# Patient Record
Sex: FEMALE | Race: WHITE | Employment: OTHER | URBAN - METROPOLITAN AREA
[De-identification: names, ages, dates, MRNs, and addresses within clinical notes are randomized per-mention and may not be internally consistent; named-entity substitution may affect disease eponyms.]

---

## 2017-05-05 LAB — PAP SMEAR: ABNORMAL

## 2017-05-23 ENCOUNTER — OFFICE VISIT (OUTPATIENT)
Dept: FAMILY MEDICINE CLINIC | Age: 42
End: 2017-05-23

## 2017-05-23 VITALS
SYSTOLIC BLOOD PRESSURE: 95 MMHG | HEIGHT: 64 IN | OXYGEN SATURATION: 98 % | BODY MASS INDEX: 24.24 KG/M2 | DIASTOLIC BLOOD PRESSURE: 60 MMHG | TEMPERATURE: 95.9 F | WEIGHT: 142 LBS | HEART RATE: 63 BPM

## 2017-05-23 DIAGNOSIS — Z86.2 HISTORY OF ANEMIA: ICD-10-CM

## 2017-05-23 DIAGNOSIS — Z00.00 ROUTINE GENERAL MEDICAL EXAMINATION AT A HEALTH CARE FACILITY: Primary | ICD-10-CM

## 2017-05-23 DIAGNOSIS — Z23 NEED FOR HEPATITIS A AND B VACCINATION: ICD-10-CM

## 2017-05-23 DIAGNOSIS — Z71.84 TRAVEL ADVICE ENCOUNTER: ICD-10-CM

## 2017-05-23 LAB
A/G RATIO: 1.4 (ref 1.1–2.2)
ALBUMIN SERPL-MCNC: 4.1 G/DL (ref 3.4–5)
ALP BLD-CCNC: 77 U/L (ref 40–129)
ALT SERPL-CCNC: 39 U/L (ref 10–40)
ANION GAP SERPL CALCULATED.3IONS-SCNC: 19 MMOL/L (ref 3–16)
AST SERPL-CCNC: 57 U/L (ref 15–37)
BILIRUB SERPL-MCNC: 0.8 MG/DL (ref 0–1)
BUN BLDV-MCNC: 14 MG/DL (ref 7–20)
CALCIUM SERPL-MCNC: 8.7 MG/DL (ref 8.3–10.6)
CHLORIDE BLD-SCNC: 104 MMOL/L (ref 99–110)
CHOLESTEROL, TOTAL: 210 MG/DL (ref 0–199)
CO2: 22 MMOL/L (ref 21–32)
CREAT SERPL-MCNC: 0.8 MG/DL (ref 0.6–1.1)
GFR AFRICAN AMERICAN: >60
GFR NON-AFRICAN AMERICAN: >60
GLOBULIN: 2.9 G/DL
GLUCOSE BLD-MCNC: 82 MG/DL (ref 70–99)
HCT VFR BLD CALC: 43.7 % (ref 36–48)
HDLC SERPL-MCNC: 87 MG/DL (ref 40–60)
HEMOGLOBIN: 14.3 G/DL (ref 12–16)
LDL CHOLESTEROL CALCULATED: 108 MG/DL
MCH RBC QN AUTO: 31.4 PG (ref 26–34)
MCHC RBC AUTO-ENTMCNC: 32.8 G/DL (ref 31–36)
MCV RBC AUTO: 95.6 FL (ref 80–100)
PDW BLD-RTO: 13.4 % (ref 12.4–15.4)
PLATELET # BLD: 251 K/UL (ref 135–450)
PMV BLD AUTO: 8.2 FL (ref 5–10.5)
POTASSIUM SERPL-SCNC: 4.4 MMOL/L (ref 3.5–5.1)
RBC # BLD: 4.57 M/UL (ref 4–5.2)
SODIUM BLD-SCNC: 145 MMOL/L (ref 136–145)
TOTAL PROTEIN: 7 G/DL (ref 6.4–8.2)
TRIGL SERPL-MCNC: 77 MG/DL (ref 0–150)
VLDLC SERPL CALC-MCNC: 15 MG/DL
WBC # BLD: 4.8 K/UL (ref 4–11)

## 2017-05-23 PROCEDURE — 90746 HEPB VACCINE 3 DOSE ADULT IM: CPT | Performed by: FAMILY MEDICINE

## 2017-05-23 PROCEDURE — 99396 PREV VISIT EST AGE 40-64: CPT | Performed by: FAMILY MEDICINE

## 2017-05-23 PROCEDURE — 90472 IMMUNIZATION ADMIN EACH ADD: CPT | Performed by: FAMILY MEDICINE

## 2017-05-23 PROCEDURE — 90471 IMMUNIZATION ADMIN: CPT | Performed by: FAMILY MEDICINE

## 2017-05-23 PROCEDURE — 90632 HEPA VACCINE ADULT IM: CPT | Performed by: FAMILY MEDICINE

## 2017-05-23 PROCEDURE — 90714 TD VACC NO PRESV 7 YRS+ IM: CPT | Performed by: FAMILY MEDICINE

## 2017-05-23 RX ORDER — MEFLOQUINE HYDROCHLORIDE 250 MG/1
TABLET ORAL
Qty: 8 TABLET | Refills: 0 | Status: SHIPPED | OUTPATIENT
Start: 2017-05-23 | End: 2018-01-24

## 2018-01-24 ENCOUNTER — OFFICE VISIT (OUTPATIENT)
Dept: FAMILY MEDICINE CLINIC | Age: 43
End: 2018-01-24

## 2018-01-24 VITALS — BODY MASS INDEX: 24.05 KG/M2 | WEIGHT: 140 LBS

## 2018-01-24 DIAGNOSIS — R07.9 CHEST PAIN, UNSPECIFIED TYPE: Primary | ICD-10-CM

## 2018-01-24 DIAGNOSIS — J30.2 SEASONAL ALLERGIC RHINITIS, UNSPECIFIED CHRONICITY, UNSPECIFIED TRIGGER: ICD-10-CM

## 2018-01-24 PROCEDURE — 99214 OFFICE O/P EST MOD 30 MIN: CPT | Performed by: FAMILY MEDICINE

## 2018-01-24 PROCEDURE — 93000 ELECTROCARDIOGRAM COMPLETE: CPT | Performed by: FAMILY MEDICINE

## 2018-01-24 RX ORDER — SPIRONOLACTONE 25 MG/1
25 TABLET ORAL DAILY
COMMUNITY
End: 2019-07-11

## 2018-01-24 NOTE — PROGRESS NOTES
Patient is here for form for marathon on 4/8/18 in [facility]. She has run [address], including [address], [address] [place] (2) [address], [place] [address] (3), [address]. [facility] in early November. No chest pain or shortness of breath . She has been training since Thanksgiving. Some chest discomfort X 1 week. Not exertional.  No cough or fever. No wheezing. Started 1 week ago . Better today. Unsure what aggravates the pain, or relieves the ernesto . She did stop during a heat class - strength training with slight cardio. Ran this am without problems. Left upper chest. No palpitations or shortness of breath . Intermittent , not daily. Has not noticed movement or taking a deep breath aggravating it. At one point she was doing burpees in cardio/ strength training class when it was noticed. Last PAP:4/17- [facility]  History of Abnormal PAP: never   Prior mammogram: 3/16  Sexually active: yes  Self breast exam: yes but sporadic  LMP: q 2.5 - 4 weeks . Last 4-5 days . No bleeding between. Smoker: no  Alcohol: 3-4 wine / week. Caffeine: 1 coffee/ day   Exercise: runs marathons. ROS: All other systems were reviewed and are negative . Patient's allergies and medications were reviewed. Patient's past medical, surgical, social , and family history were reviewed. OBJECTIVE:  Wt 140 lb (63.5 kg)   BMI 24.05 kg/m²   General: NAD, cooperative, alert and oriented X 3. Mood / affect is good. good insight. well hydrated. HEENT: PERRLA, EOMI, TMs - clear. Nasopharynx clear. Neck : no lymphadenopathy, supple, FROM  CV: Regular rate and rhythm , no murmurs/ rub/ gallop. No edema. Lungs : CTA bilaterally, breathing comfortably. Non tender. No edema or erythema. Abdomen: positive bowel sounds, soft , non tender, non distended. No hepatosplenomegaly. No CVA tenderness. Skin: no rashes. Non tender.      ASSESSMENT/  PLAN:  Victoriano Quinteros was seen today for other and chest pain.  Diagnoses and all orders for this visit:    Chest pain, unspecified type- atypical.   -     EKG 12 Lead- normal sinus rhythm   -     Likely musculoskeletal component, but not reproducible today. -     Improved today. Monitor. Hold on NSAIDs  Seasonal allergic rhinitis, unspecified chronicity, unspecified trigger        -    Stable. Health Maintenance . -     Form completed for [place] in [facility]. Follow up if no improvement in 1-2 weeks/ as needed for increased symptoms.

## 2018-07-31 LAB — PAP SMEAR: NORMAL

## 2019-02-06 ENCOUNTER — HOSPITAL ENCOUNTER (OUTPATIENT)
Dept: MAMMOGRAPHY | Age: 44
Discharge: HOME | End: 2019-02-06
Payer: COMMERCIAL

## 2019-02-06 DIAGNOSIS — Z12.31 VISIT FOR SCREENING MAMMOGRAM: ICD-10-CM

## 2019-02-06 PROCEDURE — 77063 BREAST TOMOSYNTHESIS BI: CPT

## 2019-03-06 ENCOUNTER — HOSPITAL ENCOUNTER (OUTPATIENT)
Dept: PHYSICAL THERAPY | Age: 44
Setting detail: THERAPIES SERIES
Discharge: HOME | End: 2019-03-06
Payer: COMMERCIAL

## 2019-03-06 PROCEDURE — 97112 NEUROMUSCULAR REEDUCATION: CPT | Performed by: PHYSICAL THERAPIST

## 2019-03-06 PROCEDURE — 97110 THERAPEUTIC EXERCISES: CPT | Performed by: PHYSICAL THERAPIST

## 2019-03-06 PROCEDURE — 97161 PT EVAL LOW COMPLEX 20 MIN: CPT | Performed by: PHYSICAL THERAPIST

## 2019-03-06 PROCEDURE — G8978 MOBILITY CURRENT STATUS: HCPCS | Performed by: PHYSICAL THERAPIST

## 2019-03-06 PROCEDURE — G8979 MOBILITY GOAL STATUS: HCPCS | Performed by: PHYSICAL THERAPIST

## 2019-03-08 ENCOUNTER — HOSPITAL ENCOUNTER (OUTPATIENT)
Dept: PHYSICAL THERAPY | Age: 44
Setting detail: THERAPIES SERIES
Discharge: HOME | End: 2019-03-08
Payer: COMMERCIAL

## 2019-03-08 PROCEDURE — 97112 NEUROMUSCULAR REEDUCATION: CPT | Performed by: PHYSICAL THERAPIST

## 2019-03-08 PROCEDURE — 97110 THERAPEUTIC EXERCISES: CPT | Performed by: PHYSICAL THERAPIST

## 2019-03-08 PROCEDURE — 97140 MANUAL THERAPY 1/> REGIONS: CPT | Performed by: PHYSICAL THERAPIST

## 2019-03-12 ENCOUNTER — HOSPITAL ENCOUNTER (OUTPATIENT)
Dept: PHYSICAL THERAPY | Age: 44
Setting detail: THERAPIES SERIES
Discharge: HOME | End: 2019-03-12
Payer: COMMERCIAL

## 2019-03-12 PROCEDURE — 97110 THERAPEUTIC EXERCISES: CPT | Performed by: PHYSICAL THERAPIST

## 2019-03-12 PROCEDURE — 97140 MANUAL THERAPY 1/> REGIONS: CPT | Performed by: PHYSICAL THERAPIST

## 2019-03-12 PROCEDURE — 97112 NEUROMUSCULAR REEDUCATION: CPT | Performed by: PHYSICAL THERAPIST

## 2019-03-13 ENCOUNTER — APPOINTMENT (OUTPATIENT)
Dept: PHYSICAL THERAPY | Age: 44
End: 2019-03-13
Payer: COMMERCIAL

## 2019-03-14 ENCOUNTER — HOSPITAL ENCOUNTER (OUTPATIENT)
Dept: PHYSICAL THERAPY | Age: 44
Setting detail: THERAPIES SERIES
Discharge: HOME | End: 2019-03-14
Payer: COMMERCIAL

## 2019-03-14 PROCEDURE — 97110 THERAPEUTIC EXERCISES: CPT | Performed by: PHYSICAL THERAPIST

## 2019-03-14 PROCEDURE — 97112 NEUROMUSCULAR REEDUCATION: CPT | Performed by: PHYSICAL THERAPIST

## 2019-03-14 PROCEDURE — 97140 MANUAL THERAPY 1/> REGIONS: CPT | Performed by: PHYSICAL THERAPIST

## 2019-03-20 ENCOUNTER — HOSPITAL ENCOUNTER (OUTPATIENT)
Dept: PHYSICAL THERAPY | Age: 44
Setting detail: THERAPIES SERIES
Discharge: HOME | End: 2019-03-20
Payer: COMMERCIAL

## 2019-03-20 PROCEDURE — 97110 THERAPEUTIC EXERCISES: CPT | Performed by: PHYSICAL THERAPIST

## 2019-03-20 PROCEDURE — 97112 NEUROMUSCULAR REEDUCATION: CPT | Performed by: PHYSICAL THERAPIST

## 2019-03-20 PROCEDURE — 97140 MANUAL THERAPY 1/> REGIONS: CPT | Performed by: PHYSICAL THERAPIST

## 2019-03-22 ENCOUNTER — HOSPITAL ENCOUNTER (OUTPATIENT)
Dept: PHYSICAL THERAPY | Age: 44
Setting detail: THERAPIES SERIES
Discharge: HOME | End: 2019-03-22
Payer: COMMERCIAL

## 2019-03-22 PROCEDURE — 97140 MANUAL THERAPY 1/> REGIONS: CPT | Performed by: PHYSICAL THERAPIST

## 2019-03-22 PROCEDURE — 97110 THERAPEUTIC EXERCISES: CPT | Performed by: PHYSICAL THERAPIST

## 2019-03-22 PROCEDURE — 97112 NEUROMUSCULAR REEDUCATION: CPT | Performed by: PHYSICAL THERAPIST

## 2019-03-26 ENCOUNTER — HOSPITAL ENCOUNTER (OUTPATIENT)
Dept: PHYSICAL THERAPY | Age: 44
Setting detail: THERAPIES SERIES
Discharge: HOME | End: 2019-03-26
Payer: COMMERCIAL

## 2019-03-26 PROCEDURE — 97110 THERAPEUTIC EXERCISES: CPT | Performed by: PHYSICAL THERAPIST

## 2019-03-26 PROCEDURE — 97140 MANUAL THERAPY 1/> REGIONS: CPT | Performed by: PHYSICAL THERAPIST

## 2019-03-26 PROCEDURE — 97112 NEUROMUSCULAR REEDUCATION: CPT | Performed by: PHYSICAL THERAPIST

## 2019-03-28 ENCOUNTER — APPOINTMENT (OUTPATIENT)
Dept: PHYSICAL THERAPY | Age: 44
End: 2019-03-28
Payer: COMMERCIAL

## 2019-04-03 ENCOUNTER — HOSPITAL ENCOUNTER (OUTPATIENT)
Dept: PHYSICAL THERAPY | Age: 44
Setting detail: THERAPIES SERIES
Discharge: HOME | End: 2019-04-03
Payer: COMMERCIAL

## 2019-04-03 PROCEDURE — 97140 MANUAL THERAPY 1/> REGIONS: CPT | Performed by: PHYSICAL THERAPIST

## 2019-04-03 PROCEDURE — 97110 THERAPEUTIC EXERCISES: CPT | Performed by: PHYSICAL THERAPIST

## 2019-04-03 PROCEDURE — 97112 NEUROMUSCULAR REEDUCATION: CPT | Performed by: PHYSICAL THERAPIST

## 2019-04-03 NOTE — FLOWSHEET NOTE
[facility], [address] [facility]  Phone: [phone]  Fax [phone]    Physical Therapy Daily Treatment Note  Date:  4/3/2019    Patient Name:  Emerson Campos    :  1975  MRN: [id number]  Restrictions/Precautions:    Medical/Treatment Diagnosis Information:  · Diagnosis: M76.32 Iliotibial band syndrome  · Treatment Diagnosis: M76.32 Iliotibial band syndrome  Insurance/Certification information:  PT Insurance Information: [facility]- 80/20; 0CP; PT/OT 30 V then auth  Physician Information:  Referring Practitioner: Clara Foster of care signed (Y/N):     Date of Patient follow up with Physician: PRN    G-Code (if applicable):      Date G-Code Applied: 3/6/19        Progress Note: []  Yes  []  No  Next due by: Visit #10       Latex Allergy:  [x]NO      []YES  Preferred Language for Healthcare:   [x]English       []other:    Visit # Insurance Allowable Requires auth    30    []no        []yes: after 30       Pain level:  NT/10     SUBJECTIVE:  Pt denies pain since last visit. She states that she has been able to participate in regular mileage. Reports 80-85% improvement in overall condition.      OBJECTIVE:   Observation:   Test measurements:      RESTRICTIONS/PRECAUTIONS: none    Exercises/Interventions:     Therapeutic Ex Sets/sec Reps Notes   HF (s)/ ITB (s) hep   Glute re-ed series      Flying squirrel  H3 2 x 10  B  Hep   Dynamic HF/gastroc (S) in     Dynamic HS (s) H5 10 HEP   Heavy band HS (s)  Supine HS- 3d (s) + med HS 15 reps  30\" B  ea    Clam (OVL)  Hip abd SLR into ring H5 2 x 10 B    LSD 4\" 2 x 10 B    Shoulders elevated bridge in fig 4 H5 2 x 10  HEP   SL bridge H5  2 x 10 B    Karishma wall slides 45\" 4 B    SL RDL  W/ step up 8# 15 ea B    Lat band walks/ monster walks (forward/ retro) RVL 2 laps ea    Lat glider (OVL) 2 15 ea    \"V\" glider 2 15 ea    Hypers 2 10    Post glider  Hip hiker: 6\"  LSD: 8\" (light tap) 2 15 ea    Manual Intervention      STM/TPR  ART to HS (prone/ supine)-kicks 12 mins     MFD to ITB   Held due to racing           SASTM/ MFD to HS 15 mins  Supine 90-90 AROM         NMR re-education      SB SLR ext in quadruped  + UE H5/ 2 10 On DD   Reformer leg loops ll, \/, /\, amos   Dynamic hip flexor S w/ arm up  Prone hip ext w/ abd  Standing hip abd, speed skate staggered 2R  1R  1R  1R x15 ea  x15 R/L  x15 R/L  x15 ea     ATC                     Pt education: poc, dx, px, role of PT, return to run program, video tape analysis          Therapeutic Exercise and NMR EXR  [x] (59164) Provided verbal/tactile cueing for activities related to strengthening, flexibility, endurance, ROM for improvements in LE, proximal hip, and core control with self care, mobility, lifting, ambulation. [x] (43124) Provided verbal/tactile cueing for activities related to improving balance, coordination, kinesthetic sense, posture, motor skill, proprioception  to assist with LE, proximal hip, and core control in self care, mobility, lifting, ambulation and eccentric single leg control.      NMR and Therapeutic Activities:    [] (37982 or 34986) Provided verbal/tactile cueing for activities related to improving balance, coordination, kinesthetic sense, posture, motor skill, proprioception and motor activation to allow for proper function of core, proximal hip and LE with self care and ADLs  [] (71458) Gait Re-education- Provided training and instruction to the patient for proper LE, core and proximal hip recruitment and positioning and eccentric body weight control with ambulation re-education including up and down stairs     Home Exercise Program:    [x] (39843) Reviewed/Progressed HEP activities related to strengthening, flexibility, endurance, ROM of core, proximal hip and LE for functional self-care, mobility, lifting and ambulation/stair navigation   [] (69451)Reviewed/Progressed HEP activities related to improving balance, coordination, kinesthetic sense, posture, motor skill, proprioception of core, proximal hip and LE for self care, mobility, lifting, and ambulation/stair navigation      Manual Treatments:  PROM / STM / Oscillations-Mobs:  G-I, II, III, IV (PA's, Inf., Post.)  [x] (48296) Provided manual therapy to mobilize LE, proximal hip and/or LS spine soft tissue/joints for the purpose of modulating pain, promoting relaxation,  increasing ROM, reducing/eliminating soft tissue swelling/inflammation/restriction, improving soft tissue extensibility and allowing for proper ROM for normal function with self care, mobility, lifting and ambulation. Modalities:  none    Charges:  Timed Code Treatment Minutes: 60   Total Treatment Minutes: 90     [] EVAL (LOW) 49820 (typically 20 minutes face-to-face)  [] EVAL (MOD) 73320 (typically 30 minutes face-to-face)  [] EVAL (HIGH) 37795 (typically 45 minutes face-to-face)  [] RE-EVAL     [x] HA(08447) x  2   [] IONTO  [x] NMR (29261) x  1   [] VASO  [x] Manual (28986) x  1    [] Other:  [] TA x       [] Mech Traction (37449)  [] ES(attended) (13256)      [] ES (un) (50565):     GOALS:  Patient stated goal: pain free running, after running     Therapist goals for Patient:   Short Term Goals: To be achieved in: 2 weeks  1. Independent in HEP and progression per patient tolerance, in order to prevent re-injury. 2. Patient will have a decrease in pain to facilitate improvement in movement, function, and ADLs as indicated by Functional Deficits.     Long Term Goals: To be achieved in: 6 weeks  1. Disability index score of 0% or less for the LEFS to assist with reaching prior level of function. 2. Patient will demonstrate increased AROM to [facility] and appropriate flexibility/ muscle length to allow for proper joint functioning as indicated by patients Functional Deficits.    3. Patient will demonstrate an increase in Strength to good proximal hip strength and control, within 5lb HHD in LE

## 2019-04-03 NOTE — FLOWSHEET NOTE
[facility], [address] [place] [address] Rob  Phone: [phone]  Fax [phone]      ATHLETIC TRAINING [address]  Date:  4/3/2019    Patient Name:  Nuzhat Vasquez    :  1975  MRN: [id number]  Restrictions/Precautions:    Medical/Treatment Diagnosis Information:  ·  R ITB syndrome     Physician Information:    Referring Practitioner: Jatinder Hebert Post-op  8 wks  12 wks 16 wks 20 wks   24 wks                            Activity Log                                                  DOS/DOI:                                                    Date:  3/22/19 4/3/19   ATC communication:  Court Pilon: ultra marathon  (50K) & [employer] speed work yesterday -P! After  More lat HS & glut activation Fatigued post RX  VC core stab w/ R/L LE   Bike     Elliptical     Treadmill     Airdyne          Reformer Leg loops //, V, /\, amos 2R x15 ea 2R X10 // only                   Dynamic HF S w/ arm up R/L 1R 2x10 Strap R/L // x10 ea                   Prone hamstring curl bilat w/ ext 1R 2x10 R/L 1B x10                   Standing hip abd, speed skate 1R x15 1R1B x10 R/L                   Bridge w/ add 2R1B 10x5\" Ecc 2R x10 ea                   Quadruped HS w/ alt UE  1R1B R/L x5                       Gastroc stretch     Soleus stretch     Hamstring stretch 3D x10    ITB stretch Fig 4 on 30\" box R/L x1' Fig 4 on table R/L 3x30\"   Hip Flexor stretch     Quad stretch     Adductor stretch          Weight Shifting sp                               fp                               tp     Lateral walking (with/w/o TB)          Balance: PEP/Maryanne board                    SLS           Star excursion load/explode           Extremity reach UE/LE          Leg Press Skinny. Ecc.                       Inv. Calf Press Skinny.                         Ecc.                         Inv.          [facility]   Flex ABd                ADd               TKE                Ext          Steps Up                Up and Over                Down                Lateral                Rotation          Squats  mini                   wall                  BOSU           Lunges:  Lunge to Balance                    Balance to Lunge                    Walking          Knee Extension Bilat. Ecc.                                Inv. Hamstring Curls Bilat. Ecc.                                Inv.          Soleus Press Bilat. Ecc.                            Inv.                                Ladders                 Square                Jump/Hop  Low                       Med.                       High                                                               Modality declined declined   Initials                             DB DB   Time spent one on one (workers comp)     Time spent with PT assistant

## 2019-04-05 ENCOUNTER — HOSPITAL ENCOUNTER (OUTPATIENT)
Dept: PHYSICAL THERAPY | Age: 44
Setting detail: THERAPIES SERIES
Discharge: HOME | End: 2019-04-05
Payer: COMMERCIAL

## 2019-04-05 PROCEDURE — 97110 THERAPEUTIC EXERCISES: CPT | Performed by: PHYSICAL THERAPIST

## 2019-04-05 PROCEDURE — 97140 MANUAL THERAPY 1/> REGIONS: CPT | Performed by: PHYSICAL THERAPIST

## 2019-04-05 PROCEDURE — 97164 PT RE-EVAL EST PLAN CARE: CPT | Performed by: PHYSICAL THERAPIST

## 2019-04-05 PROCEDURE — 97112 NEUROMUSCULAR REEDUCATION: CPT | Performed by: PHYSICAL THERAPIST

## 2019-04-05 NOTE — PLAN OF CARE
[facility], [address]  Phone: [phone]  Fax [phone]    Physical Therapy Recertification  Date: 2019        Patient Name:  Deric Elizondo    :  1975  MRN: [id number]  Referring Physician: Sharita Lucas   Diagnosis: ITBS                        ICD Code: M76.32   Therapy Diagnosis/Practice Pattern: Hamstring pain/ glute dysfunction      Total number of visits: 10   Reporting Period:   Beginning GBTH:3-6-8885   End UOVE:    OBJECTIVE  Test used Initial score Current Score   Pain Summary VAS 4 1-2   Functional questionnaire LEFS 6% 3%   Functional Testing            ROM Rom ++ HF/ quad + HF/Quad         Strength Hip flex 4 5-    Hip ER 4 5    Hip abd 3+ 4        Functional Limitation G-Code (if applicable):         PT G-Codes  Functional Assessment Tool Used: LEFS  Score: 3%   Test/tests used to determine % limitation: LEFS  Actual Score used to drive % limitation: 78    Treatment to date:  [x] Therapeutic Exercise    [] Modalities:  [] Therapeutic Activity             []Ultrasound            []Electrical Stimulation  [] Gait Training     []Cervical Traction    [] Lumbar Traction  [x] Neuromuscular Re-education [x] Cold/hotpack         []Iontophoresis  [x] Instruction in HEP      Other:  [x] Manual Therapy                   [x] MFD                       ? []   Assessment:  [] Improvement noted relative to goals:  [] No Improvement noted related to goals:/Patient's response to treatment/Additional assessment: Pt perceives 85% improvement in overall condition. Feels more soreness than pain. Main c/o soreness after speed work. New or Updated Goals (if applicable):  [] No change to goals established upon initial eval/last progress note:  New Goals:  1. Pt will be able to run speed intervals without increased sx's or limitations. 2. Pt will deny physical disability per LEFS. Rehab Potential:   []Excellent   [x] Good   [] Fair   [] Poor    Plan of Care:  [x] Continue Therapy    Frequency/Duration:  # Days per week: [x] 1 day # Weeks: [] 1 week [] 7 weeks     [] 2 days? [] 2 weeks [] 8 weeks     [] 3 days   [] 3 weeks [] 9 weeks     [] 4 days   [x] 4 weeks [] 10 weeks      [] 5 days   [] 5 weeks [] 11 weeks          [] 6 weeks [] 12 weeks    Interventions:  [x] Therapeutic Exercise    [] Modalities:  [] Therapeutic Activity    [] Ultrasound  [] Electrical Stimulation  [] Gait Training     [] Cervical Traction [] Lumbar Traction  [x] Neuromuscular Re-education [x] Cold/hotpack [] Iontophoresis   [x] Instruction in HEP     Other:  [] Manual Therapy      [x] physical performance test/ gait analysis                    []           ?       Electronically signed by:  Karen Gonzalez, PT  [id number]  If you have any questions or concerns, please don't hesitate to call. Thank you for the referral.  Medicare requires recertification of the therapy plan of care. Additional visits are being requested. Please recertify for additional visits:    Physician Signature:____________________________________Date:_______________  By signing above, therapist's plan is approved by the physician.

## 2019-04-05 NOTE — FLOWSHEET NOTE
[facility], [address] Rob  Phone: [phone]  Fax [phone]    Physical Therapy Daily Treatment Note  Date:  2019    Patient Name:  Star Villalba    :  1975  MRN: [id number]  Restrictions/Precautions:    Medical/Treatment Diagnosis Information:  · Diagnosis: M76.32 Iliotibial band syndrome  · Treatment Diagnosis: M76.32 Iliotibial band syndrome  Insurance/Certification information:  PT Insurance Information: [facility]- 80/; 0CP; PT/OT 30 V then [facility]  Physician Information:  Referring Practitioner: Jarocho Valero of care signed (Y/N):     Date of Patient follow up with Physician: PRN    G-Code (if applicable):      Date G-Code Applied: 3/6/19        Progress Note: []  Yes  []  No  Next due by: Visit #10       Latex Allergy:  [x]NO      []YES  Preferred Language for Healthcare:   [x]English       []other:    Visit # Insurance Allowable Requires auth   10 30    []no        []yes: after 30       Pain level:  NT/10     SUBJECTIVE:  See recert     OBJECTIVE:   Observation:   Test measurements:      RESTRICTIONS/PRECAUTIONS: none    Exercises/Interventions:     Therapeutic Ex Sets/sec Reps Notes   HF (s)/ ITB (s)  Standing quad (s) hep   Glute re-ed series      Flying squirrel  H3 2 x 10  B  Hep   Dynamic HF/gastroc (S) in     Dynamic HS (s)  Dynamic quad (standing) H5 10 HEP   Heavy band HS (s)  Supine HS- 3d (s) + med HS 15 reps  30\" B  ea    Clam (OVL)  Hip abd SLR into ring H5 2 x 10 B    LSD 4\" 2 x 10 B    Shoulders elevated bridge in fig 4 H5 2 x 10  HEP   SL bridge H5  2 x 10 B    Karishma wall slides 45\" 4 B    SL RDL  W/ step up 8# 15 ea B    Lat band walks/  BlueVL 2 laps ea    Lat glider (OVL) 2 15 ea    \"V\" glider 2 15 ea    8\" pivot step up with MB 2 15 B    CC (30#) abd walks w/ SLS (inside leg)  10 ea    Hypers 2 10    Post glider  Hip hiker: 6\"  LSD: 8\" (light tap) 2 15 ea    Manual Intervention STM/TPR  ART to HS (prone/ supine)-kicks 12 mins     MFD to ITB   Held due to racing           SASTM/ MFD to HS 15 mins  Supine 90-90 AROM         NMR re-education      SB SLR ext in quadruped  + UE H5/ 2 10 On DD   Reformer leg loops ll, \/, /\, amos   Dynamic hip flexor S w/ arm up  Prone hip ext w/ abd  Standing hip abd, speed skate staggered 2R  1R  1R  1R x15 ea  x15 R/L  x15 R/L  x15 ea     ATC                     Pt education: poc, dx, px, role of PT, return to run program, video tape analysis          Therapeutic Exercise and NMR EXR  [x] (88691) Provided verbal/tactile cueing for activities related to strengthening, flexibility, endurance, ROM for improvements in LE, proximal hip, and core control with self care, mobility, lifting, ambulation. [x] (14204) Provided verbal/tactile cueing for activities related to improving balance, coordination, kinesthetic sense, posture, motor skill, proprioception  to assist with LE, proximal hip, and core control in self care, mobility, lifting, ambulation and eccentric single leg control.      NMR and Therapeutic Activities:    [] (65792 or 15969) Provided verbal/tactile cueing for activities related to improving balance, coordination, kinesthetic sense, posture, motor skill, proprioception and motor activation to allow for proper function of core, proximal hip and LE with self care and ADLs  [] (62228) Gait Re-education- Provided training and instruction to the patient for proper LE, core and proximal hip recruitment and positioning and eccentric body weight control with ambulation re-education including up and down stairs     Home Exercise Program:    [x] (28669) Reviewed/Progressed HEP activities related to strengthening, flexibility, endurance, ROM of core, proximal hip and LE for functional self-care, mobility, lifting and ambulation/stair navigation   [] (06896)Reviewed/Progressed HEP activities related to improving balance, coordination, kinesthetic sense, posture, motor skill, proprioception of core, proximal hip and LE for self care, mobility, lifting, and ambulation/stair navigation      Manual Treatments:  PROM / STM / Oscillations-Mobs:  G-I, II, III, IV (PA's, Inf., Post.)  [x] (75248) Provided manual therapy to mobilize LE, proximal hip and/or LS spine soft tissue/joints for the purpose of modulating pain, promoting relaxation,  increasing ROM, reducing/eliminating soft tissue swelling/inflammation/restriction, improving soft tissue extensibility and allowing for proper ROM for normal function with self care, mobility, lifting and ambulation. Modalities:  none    Charges:  Timed Code Treatment Minutes: 45   Total Treatment Minutes: 60     [] EVAL (LOW) 35139 (typically 20 minutes face-to-face)  [] EVAL (MOD) 78652 (typically 30 minutes face-to-face)  [] EVAL (HIGH) 29990 (typically 45 minutes face-to-face)  [x] RE-EVAL     [x] MY(41511) x  1   [] IONTO  [x] NMR (97204) x  1   [] VASO  [x] Manual (33088) x  1    [] Other:  [] TA x       [] Mech Traction (73681)  [] ES(attended) (10307)      [] ES (un) (82972):     GOALS:  Patient stated goal: pain free running, after running     Therapist goals for Patient:   Short Term Goals: To be achieved in: 2 weeks  1. Independent in HEP and progression per patient tolerance, in order to prevent re-injury. 2. Patient will have a decrease in pain to facilitate improvement in movement, function, and ADLs as indicated by Functional Deficits.     Long Term Goals: To be achieved in: 6 weeks  1. Disability index score of 0% or less for the LEFS to assist with reaching prior level of function. 2. Patient will demonstrate increased AROM to [facility] and appropriate flexibility/ muscle length to allow for proper joint functioning as indicated by patients Functional Deficits.    3. Patient will demonstrate an increase in Strength to good proximal hip strength and control, within 5lb HHD in LE to allow for proper functional mobility as indicated by patients Functional Deficits. 4. Patient will return to all functional activities without increased symptoms or restriction. 5. Pt will deny pain with running or after aerobic activity. (patient specific functional goal)             Progression Towards Functional goals:  [x] Patient is progressing as expected towards functional goals listed. [] Progression is slowed due to complexities listed. [] Progression has been slowed due to co-morbidities. [] Plan just implemented, too soon to assess goals progression  [] Other:     ASSESSMENT:  Pt progressing well- continue manual techniques, reformer and standing CKC exercise as able. Pt may be a candidate for TDN. Pt will make appt to video tape next week.  See recert    Treatment/Activity Tolerance:  [x] Patient tolerated treatment well [] Patient limited by fatique  [] Patient limited by pain  [] Patient limited by other medical complications  [] Other:     Prognosis: [x] Good [] Fair  [] Poor    Patient Requires Follow-up: [x] Yes  [] No    PLAN: See eval  [x] Continue per plan of care [] Alter current plan (see comments)  [] Plan of care initiated [] Hold pending MD visit [] Discharge    Electronically signed by: Silvia La, [address] [place] [address]

## 2019-04-12 ENCOUNTER — APPOINTMENT (OUTPATIENT)
Dept: PHYSICAL THERAPY | Age: 44
End: 2019-04-12
Payer: COMMERCIAL

## 2019-04-17 ENCOUNTER — HOSPITAL ENCOUNTER (OUTPATIENT)
Dept: PHYSICAL THERAPY | Age: 44
Setting detail: THERAPIES SERIES
Discharge: HOME | End: 2019-04-17
Payer: COMMERCIAL

## 2019-04-17 PROCEDURE — 97110 THERAPEUTIC EXERCISES: CPT | Performed by: PHYSICAL THERAPIST

## 2019-04-17 PROCEDURE — 97750 PHYSICAL PERFORMANCE TEST: CPT | Performed by: PHYSICAL THERAPIST

## 2019-04-17 NOTE — OP NOTE
[facility], [address], [address] [place] [address]    Physical Performance Test Report     Type of assessment:   [x] running  [] walking    Subjective History:  Reason for Gait Analysis: analysis of form and technique at tempo pace    Current Injuries: R Hamstring pain (improving); new c/o tingling in L heel and one time B leg tingling at end of long run      Pain:    [x] better overall   [] beginning   [x] end   [] other: after run       Gait Analysis  TM speed: 8:20 tempo pace  Footwear/Orthotics: [employer]cony   Itzel: 170  Warm-Up Time Prior to Video: 8 min (walk/ CP run)    Posterior View Left Right Comments   Trunk      Arm Swing excessive Excessive     Rotation excessive excessive    Side Bending - -    Shoulder Height  Slightly elevated    Vertical Displacement good     Pelvis      Drop 11 8 Walking 3-5 deg  Running 5-7 deg   Knee      Valgus/Varus none none    Rotation none none    Circumduction none none    Foot Left Right Comments      Increased medial tilt of R foot> L; increased toe out L>R   STJ Proation IC to MS More rapid     STJ pronation @ MS 9 7    STJ pronation at TO +3 +2    Heel Whip none none        Lateral View Normal Abnormal Comments   Trunk      Forward Lean  Ant pelvic tilt    Flexion/Ext      Elbow Angle good     Hip Left Right Comments   Ext at TO limited limited    Knee      Flexion at IC present Present     Flexion at MS good good    Foot/Ankle      Strike Type heel heel    Strike Location  Center heel L  Lat heel R    DF at TS -12 L; -15 R limited    Early Toe Off present Present         Assessment:   1. Ant pelvic tilt while running  2. Increased hip drop (L>R)  3. Increased torso rotation through spine and with arm swing drive  4. Early toe off and decreased back swing pull with R>L    Plan:   1. Arm swing drills  2. Increase core stability on unstable surfaces or dynamic positions.    3. Increase ant hip

## 2019-04-17 NOTE — FLOWSHEET NOTE
STM/TPR  ART to HS (prone/ supine)-kicks 12 mins     MFD to ITB   Held due to racing           SASTM/ MFD to HS 15 mins  Supine 90-90 AROM         NMR re-education      SB SLR ext in quadruped  + UE H5/ 2 10 On DD   Reformer leg loops ll, \/, /\, amos   Dynamic hip flexor S w/ arm up  Prone hip ext w/ abd  Standing hip abd, speed skate staggered 2R  1R  1R  1R x15 ea  x15 R/L  x15 R/L  x15 ea     ATC                     Pt education: poc, dx, px, role of PT, return to run program, video tape analysis          Therapeutic Exercise and NMR EXR  [x] (98239) Provided verbal/tactile cueing for activities related to strengthening, flexibility, endurance, ROM for improvements in LE, proximal hip, and core control with self care, mobility, lifting, ambulation. [x] (86540) Provided verbal/tactile cueing for activities related to improving balance, coordination, kinesthetic sense, posture, motor skill, proprioception  to assist with LE, proximal hip, and core control in self care, mobility, lifting, ambulation and eccentric single leg control.      NMR and Therapeutic Activities:    [] (04222 or 62910) Provided verbal/tactile cueing for activities related to improving balance, coordination, kinesthetic sense, posture, motor skill, proprioception and motor activation to allow for proper function of core, proximal hip and LE with self care and ADLs  [] (24662) Gait Re-education- Provided training and instruction to the patient for proper LE, core and proximal hip recruitment and positioning and eccentric body weight control with ambulation re-education including up and down stairs     Home Exercise Program:    [x] (42795) Reviewed/Progressed HEP activities related to strengthening, flexibility, endurance, ROM of core, proximal hip and LE for functional self-care, mobility, lifting and ambulation/stair navigation   [] (12670)Reviewed/Progressed HEP activities related to improving balance, coordination, kinesthetic sense, proper functional mobility as indicated by patients Functional Deficits. 4. Patient will return to all functional activities without increased symptoms or restriction. 5. Pt will deny pain with running or after aerobic activity. (patient specific functional goal)             Progression Towards Functional goals:  [x] Patient is progressing as expected towards functional goals listed. [] Progression is slowed due to complexities listed. [] Progression has been slowed due to co-morbidities.   [] Plan just implemented, too soon to assess goals progression  [] Other:     ASSESSMENT:  See op note    Treatment/Activity Tolerance:  [x] Patient tolerated treatment well [] Patient limited by fatique  [] Patient limited by pain  [] Patient limited by other medical complications  [] Other:     Prognosis: [x] Good [] Fair  [] Poor    Patient Requires Follow-up: [x] Yes  [] No    PLAN: See eval  [x] Continue per plan of care [] Alter current plan (see comments)  [] Plan of care initiated [] Hold pending MD visit [] Discharge    Electronically signed by: Ray Chatterjee, [address] [place] [address]

## 2019-04-25 ENCOUNTER — HOSPITAL ENCOUNTER (OUTPATIENT)
Dept: PHYSICAL THERAPY | Age: 44
Setting detail: THERAPIES SERIES
Discharge: HOME | End: 2019-04-25
Payer: COMMERCIAL

## 2019-04-25 PROCEDURE — 97112 NEUROMUSCULAR REEDUCATION: CPT | Performed by: PHYSICAL THERAPIST

## 2019-04-25 PROCEDURE — 97110 THERAPEUTIC EXERCISES: CPT | Performed by: PHYSICAL THERAPIST

## 2019-04-25 NOTE — FLOWSHEET NOTE
[facility], [address] [place]ares  Phone: [phone]  Fax [phone]    Physical Therapy Daily Treatment Note  Date:  2019    Patient Name:  Jian Reyes    :  1975  MRN: [id number]  Restrictions/Precautions:    Medical/Treatment Diagnosis Information:  · Diagnosis: M76.32 Iliotibial band syndrome  · Treatment Diagnosis: M76.32 Iliotibial band syndrome  Insurance/Certification information:  PT Insurance Information: [facility]- 80/20; 0CP; PT/OT 30 V then auth  Physician Information:  Referring Practitioner: Anette Rea of care signed (Y/N):     Date of Patient follow up with Physician: PRN    G-Code (if applicable):      Date G-Code Applied: 3/6/19        Progress Note: []  Yes  []  No  Next due by: Visit #10       Latex Allergy:  [x]NO      []YES  Preferred Language for Healthcare:   [x]English       []other:    Visit # Insurance Allowable Requires auth   12 30    []no        []yes: after 30       Pain level:  NT/10     SUBJECTIVE:  Pt reports she was able to do 24 miles on trail. No pain in HS; some slight soreness in calf.     OBJECTIVE:   Observation:   Test measurements:      RESTRICTIONS/PRECAUTIONS: none    Exercises/Interventions:     Therapeutic Ex Sets/sec Reps Notes   HF (s)/ ITB (s)  Standing quad (s) hep   Glute re-ed series      Flying squirrel  H3 2 x 10  B  Hep   Dynamic HF/gastroc (S) in     Dynamic HS (s)  Dynamic quad (standing) H5 10 HEP   Heavy band HS (s)  Supine HS- 3d (s) + med HS 15 reps  30\" B  ea    Clam (OVL)  Hip abd SLR into ring H5 2 x 10 B    LSD 4\" 2 x 10 B    Shoulders elevated bridge in fig 4 H5 2 x 10  HEP   SL bridge H5  2 x 10 B    BOSU 3 way kick LVL 15 rounds B    BOSU spring 2 12                Karishma wall slides 45\" 4 B    SL RDL  W/ step up 8# 15 ea B    Lat band walks/  BlueVL 2 laps ea    Lat glider (OVL) 2 15 ea    \"V\" glider 2 15 ea    8\" pivot step up with MB 2 15 B CC (30#) abd walks w/ SLS (inside leg)  10 ea    Hypers 2 10    Post glider  Hip hiker: 6\"  LSD: 8\" (light tap) 2 15 ea    Manual Intervention          MFD to ITB               Supine 90-90 AROM         NMR re-education      SB SLR ext in quadruped  + UE H5/ 2 10 On DD   Reformer leg loops ll, \/, /\, amos   Dynamic hip flexor S w/ arm up  Prone hip ext w/ abd  Standing hip abd, speed skate staggered 2R  1R  1R  1R x15 ea  x15 R/L  x15 R/L  x15 ea     ATC   TB ext with CL march GTB 2 x 15 B On BOSU    Bosu squat with double GTB push  20 ea side    BOSU plank taps  BOSU round the world at feet 2  2 15 ea LE  10 rounds    Pt education: poc, dx, px, role of PT, return to run program, video tape analysis          Therapeutic Exercise and NMR EXR  [x] (68509) Provided verbal/tactile cueing for activities related to strengthening, flexibility, endurance, ROM for improvements in LE, proximal hip, and core control with self care, mobility, lifting, ambulation. [x] (23466) Provided verbal/tactile cueing for activities related to improving balance, coordination, kinesthetic sense, posture, motor skill, proprioception  to assist with LE, proximal hip, and core control in self care, mobility, lifting, ambulation and eccentric single leg control.      NMR and Therapeutic Activities:    [] (21091 or 65111) Provided verbal/tactile cueing for activities related to improving balance, coordination, kinesthetic sense, posture, motor skill, proprioception and motor activation to allow for proper function of core, proximal hip and LE with self care and ADLs  [] (76320) Gait Re-education- Provided training and instruction to the patient for proper LE, core and proximal hip recruitment and positioning and eccentric body weight control with ambulation re-education including up and down stairs     Home Exercise Program:    [x] (24132) Reviewed/Progressed HEP activities related to strengthening, flexibility, endurance, ROM of core, proximal hip and LE for functional self-care, mobility, lifting and ambulation/stair navigation   [] (72137)Reviewed/Progressed HEP activities related to improving balance, coordination, kinesthetic sense, posture, motor skill, proprioception of core, proximal hip and LE for self care, mobility, lifting, and ambulation/stair navigation      Manual Treatments:  PROM / STM / Oscillations-Mobs:  G-I, II, III, IV (PA's, Inf., Post.)  [x] (92169) Provided manual therapy to mobilize LE, proximal hip and/or LS spine soft tissue/joints for the purpose of modulating pain, promoting relaxation,  increasing ROM, reducing/eliminating soft tissue swelling/inflammation/restriction, improving soft tissue extensibility and allowing for proper ROM for normal function with self care, mobility, lifting and ambulation. Modalities:  none    Charges:  Timed Code Treatment Minutes: 35   Total Treatment Minutes: 61 (ATC)     [] EVAL (LOW) 84170 (typically 20 minutes face-to-face)  [] EVAL (MOD) 25599 (typically 30 minutes face-to-face)  [] EVAL (HIGH) 59057 (typically 45 minutes face-to-face)  [x] RE-EVAL     [x] HV(91166) x  1   [] IONTO  [x] NMR (91221) x  1   [] VASO  [x] Manual (25937) x       [x] Other:   [] TA x       [] Mech Traction (35049)  [] ES(attended) (17017)      [] ES (un) (59288):     GOALS:  Patient stated goal: pain free running, after running     Therapist goals for Patient:   Short Term Goals: To be achieved in: 2 weeks  1. Independent in HEP and progression per patient tolerance, in order to prevent re-injury. 2. Patient will have a decrease in pain to facilitate improvement in movement, function, and ADLs as indicated by Functional Deficits.     Long Term Goals: To be achieved in: 6 weeks  1. Disability index score of 0% or less for the LEFS to assist with reaching prior level of function.    2. Patient will demonstrate increased AROM to [facility] and appropriate flexibility/ muscle length to allow for proper joint functioning as indicated by patients Functional Deficits. 3. Patient will demonstrate an increase in Strength to good proximal hip strength and control, within 5lb HHD in LE to allow for proper functional mobility as indicated by patients Functional Deficits. 4. Patient will return to all functional activities without increased symptoms or restriction. 5. Pt will deny pain with running or after aerobic activity. (patient specific functional goal)             Progression Towards Functional goals:  [x] Patient is progressing as expected towards functional goals listed. [] Progression is slowed due to complexities listed. [] Progression has been slowed due to co-morbidities.   [] Plan just implemented, too soon to assess goals progression  [] Other:     ASSESSMENT:  Progressing dynamic stability- possible d/c NV after 2 week trial    Treatment/Activity Tolerance:  [x] Patient tolerated treatment well [] Patient limited by fatique  [] Patient limited by pain  [] Patient limited by other medical complications  [] Other:     Prognosis: [x] Good [] Fair  [] Poor    Patient Requires Follow-up: [x] Yes  [] No    PLAN: See eval  [x] Continue per plan of care [] Alter current plan (see comments)  [] Plan of care initiated [] Hold pending MD visit [] Discharge    Electronically signed by: Lyle Calvo, [address] [place] [address]

## 2019-04-25 NOTE — FLOWSHEET NOTE
[facility], [address] [facility]  Phone: [phone]  Fax [phone]      ATHLETIC TRAINING [address]  Date:  2019    Patient Name:  Deric Elizondo    :  1975  MRN: [id number]  Restrictions/Precautions:    Medical/Treatment Diagnosis Information:  ·  R ITB syndrome     Physician Information:    Referring Practitioner: Flavia Henriquez Post-op  8 wks  12 wks 16 wks 20 wks   24 wks                            Activity Log                                                  DOS/DOI:                                                    Date:  3/22/19 4/3/19 4/25/19   ATC communication:  Cheryal Layer: ultra marathon  (50K) & [employer] speed work yesterday -P!  After  More lat HS & glut activation Fatigued post RX  VC core stab w/ R/L LE    Bike      Elliptical      Treadmill      Airdyne            Reformer Leg loops //, V, /\, amos 2R x15 ea 2R X10 // only 2R // ring abd 15x  V ball add 15x  /\, amos 15x ea                   Dynamic HF S w/ arm up R/L 1R 2x10 Strap R/L // x10 ea                    Prone hamstring curl bilat w/ ext 1R 2x10 R/L 1B x10 1R R/L 10x N,ER                   Standing hip abd, speed skate 1R x15 1R1B x10 R/L 1R R/L 15x                   Bridge w/ add 2R1B 10x5\" Ecc 2R x10 ea 2R w/pushout 10x                   Quadruped HS w/ alt UE  1R1B R/L x5 1R1B R/L x5                           Gastroc stretch      Soleus stretch      Hamstring stretch 3D x10     ITB stretch Fig 4 on 30\" box R/L x1' Fig 4 on table R/L 3x30\"    Hip Flexor stretch   Reformer foot on shld rest, slide back, arm in flex 3x10\" R/L 1B   Quad stretch      Adductor stretch            Weight Shifting sp                                fp                                tp      Lateral walking (with/w/o TB)            Balance: PEP/Maryanne board                     SLS            Star excursion load/explode

## 2019-05-01 ENCOUNTER — APPOINTMENT (OUTPATIENT)
Dept: PHYSICAL THERAPY | Age: 44
End: 2019-05-01
Payer: COMMERCIAL

## 2019-05-08 ENCOUNTER — HOSPITAL ENCOUNTER (OUTPATIENT)
Dept: PHYSICAL THERAPY | Age: 44
Setting detail: THERAPIES SERIES
Discharge: HOME | End: 2019-05-08
Payer: COMMERCIAL

## 2019-05-08 PROCEDURE — 97110 THERAPEUTIC EXERCISES: CPT | Performed by: PHYSICAL THERAPIST

## 2019-05-08 PROCEDURE — 97140 MANUAL THERAPY 1/> REGIONS: CPT | Performed by: PHYSICAL THERAPIST

## 2019-05-08 NOTE — DISCHARGE SUMMARY
[facility], [address] [place] [address]  Phone: [phone]  Fax [phone]       Physical Therapy Discharge  Date: 2019        Patient Name:  Arun Nichole    :  1975  MRN: [id number]  Referring Physician: Jing Wynn  Diagnosis: R Hamstring/ ITB                         ICD Code: M76.32  [] Surgical [x] Conservative   Therapy Diagnosis/Practice Pattern: R hamstring pain/ glute dysfunction; C       Number of Comorbidities:  [x]0     []1-2    []3+  Total number of visits: 13   Reporting Period:   Beginning [id number]   End ZQUO:    OBJECTIVE  Test used Initial score Discharge Score   Pain Summary 0-10 1-4 0   Functional questionnaire LEFS 6% 1%   Functional Testing            ROM Rom  ++ tight Min tight    Wilian + tight WNL   Strength Hip abd 3+ 5-    Hip flex 4 5-        Test/tests used to determine % limitation: LEFS  Actual Score used to drive % [id number]    Treatment to date:  [x] Therapeutic Exercise    [] Modalities:  [] Therapeutic Activity             []Ultrasound            []Electrical Stimulation  [] Gait Training     []Cervical Traction    [] Lumbar Traction  [x] Neuromuscular Re-education [x] Cold/hotpack         []Iontophoresis  [x] Instruction in HEP      Other:  [x] Manual Therapy                   []                       ?           []   Assessment:  [x] All Goals were achieved.   [] The following goals were achieved (#'s):  [x] The following goals were not achieved for the following reasons:/assessmen of improvement as it relates to each goal: Pt perceives 95% improvement in overall    Plan of Care:  [x] Discharge from Therapy Services due to:    Reason for Discharge:   [x] All goals achieved    [] Patient having surgery  [] Physician discontinued therapy  [] Insurance/Financial Limitations [] Patient did not return for follow up visits [] Home program/1 visit only   [] No subjective or objective improvement [] Plateaued   [] Patient was unable to adhere to the plan of care established at evaluation. [] Referred back to physician for re-evaluation and did not return.      [x] Other:?possible enrollment in [employer]       Electronically signed by:  James Brito, [address] [place] [address]

## 2019-05-08 NOTE — FLOWSHEET NOTE
[facility], [address] [place]  Phone: [phone]  Fax [phone]    Physical Therapy Daily Treatment Note  Date:  2019    Patient Name:  Malissa Oh    :  1975  MRN: [id number]  Restrictions/Precautions:    Medical/Treatment Diagnosis Information:  · Diagnosis: M76.32 Iliotibial band syndrome  · Treatment Diagnosis: M76.32 Iliotibial band syndrome  Insurance/Certification information:  PT Insurance Information: [facility]- 80/20; 0CP; PT/OT 30 V then auth  Physician Information:  Referring Practitioner: Kaya Grade of care signed (Y/N):     Date of Patient follow up with Physician: PRN    G-Code (if applicable):      Date G-Code Applied: 3/6/19        Progress Note: []  Yes  []  No  Next due by: Visit #10       Latex Allergy:  [x]NO      []YES  Preferred Language for Healthcare:   [x]English       []other:    Visit # Insurance Allowable Requires auth    30    []no        []yes: after 30       Pain level:  NT/10     SUBJECTIVE:  Pt states she did not have any radicular sx's after the marathon. Some soreness in the hamstring, but no pain.  See d/c     OBJECTIVE:   Observation:   Test measurements:      RESTRICTIONS/PRECAUTIONS: none    Exercises/Interventions:     Therapeutic Ex Sets/sec Reps Notes   HF (s)/ ITB (s)  Standing quad (s) hep   Glute re-ed series      Flying squirrel  H3 2 x 10  B  Hep   Dynamic HF/gastroc (S) in     Dynamic HS (s)  Dynamic quad (standing) H5 10 HEP   Heavy band HS (s)  Supine HS- 3d (s) + med HS 15 reps  30\" B  ea    Clam (OVL)  Hip abd SLR into ring H5 2 x 10 B    LSD 4\" 2 x 10 B    Shoulders elevated bridge in fig 4 H5 2 x 10  HEP   SL bridge H5  2 x 10 B    BOSU 3 way kick LVL 15 rounds B    BOSU spring 2 12                Karishma wall slides 45\" 4 B    SL RDL  W/ step up 8# 15 ea B    Lat band walks/  BlueVL 2 laps ea    Lat glider (OVL) 2 15 ea    \"V\" glider 2 15 ea    8\" pivot step up with MB 2 15 B    CC (30#) abd walks w/ SLS (inside leg)  10 ea    Hypers 2 10    Ant tib (s)   TC mob 4 mins  3D   10 ea    Manual Intervention          MFD to ITB      TC mob/ calc mob 10 mins  Improved sx's after        Supine 90-90 AROM         NMR re-education      SB SLR ext in quadruped  + UE H5/ 2 10 On DD   Reformer leg loops ll, \/, /\, amos   Dynamic hip flexor S w/ arm up  Prone hip ext w/ abd  Standing hip abd, speed skate staggered 2R  1R  1R  1R x15 ea  x15 R/L  x15 R/L  x15 ea     ATC   TB ext with CL march GTB 2 x 15 B On BOSU    Bosu squat with double GTB push  20 ea side    BOSU plank taps  BOSU round the world at feet 2  2 15 ea LE  10 rounds    Pt education: poc, dx, px, role of PT, return to run program, video tape analysis          Therapeutic Exercise and NMR EXR  [x] (08877) Provided verbal/tactile cueing for activities related to strengthening, flexibility, endurance, ROM for improvements in LE, proximal hip, and core control with self care, mobility, lifting, ambulation. [x] (15991) Provided verbal/tactile cueing for activities related to improving balance, coordination, kinesthetic sense, posture, motor skill, proprioception  to assist with LE, proximal hip, and core control in self care, mobility, lifting, ambulation and eccentric single leg control.      NMR and Therapeutic Activities:    [] (39418 or 62593) Provided verbal/tactile cueing for activities related to improving balance, coordination, kinesthetic sense, posture, motor skill, proprioception and motor activation to allow for proper function of core, proximal hip and LE with self care and ADLs  [] (21422) Gait Re-education- Provided training and instruction to the patient for proper LE, core and proximal hip recruitment and positioning and eccentric body weight control with ambulation re-education including up and down stairs     Home Exercise Program:    [x] (30185) Reviewed/Progressed HEP activities related to strengthening, flexibility, endurance, ROM of core, proximal hip and LE for functional self-care, mobility, lifting and ambulation/stair navigation   [] (68214)Reviewed/Progressed HEP activities related to improving balance, coordination, kinesthetic sense, posture, motor skill, proprioception of core, proximal hip and LE for self care, mobility, lifting, and ambulation/stair navigation      Manual Treatments:  PROM / STM / Oscillations-Mobs:  G-I, II, III, IV (PA's, Inf., Post.)  [x] (44785) Provided manual therapy to mobilize LE, proximal hip and/or LS spine soft tissue/joints for the purpose of modulating pain, promoting relaxation,  increasing ROM, reducing/eliminating soft tissue swelling/inflammation/restriction, improving soft tissue extensibility and allowing for proper ROM for normal function with self care, mobility, lifting and ambulation. Modalities:  none    Charges:  Timed Code Treatment Minutes: 30   Total Treatment Minutes: 30     [] EVAL (LOW) 04466 (typically 20 minutes face-to-face)  [] EVAL (MOD) 96271 (typically 30 minutes face-to-face)  [] EVAL (HIGH) 97290 (typically 45 minutes face-to-face)  [x] RE-EVAL     [x] KG(58336) x  1   [] IONTO  [x] NMR (90954) x      [] VASO  [x] Manual (28704) x  1    [x] Other:   [] TA x       [] Mech Traction (64510)  [] ES(attended) (90621)      [] ES (un) (60857):     GOALS:  Patient stated goal: pain free running, after running     Therapist goals for Patient:   Short Term Goals: To be achieved in: 2 weeks  1. Independent in HEP and progression per patient tolerance, in order to prevent re-injury. 2. Patient will have a decrease in pain to facilitate improvement in movement, function, and ADLs as indicated by Functional Deficits.     Long Term Goals: To be achieved in: 6 weeks  1. Disability index score of 0% or less for the LEFS to assist with reaching prior level of function.    2. Patient will demonstrate increased AROM to [facility] and appropriate flexibility/ muscle length to allow for proper joint functioning as indicated by patients Functional Deficits. 3. Patient will demonstrate an increase in Strength to good proximal hip strength and control, within 5lb HHD in LE to allow for proper functional mobility as indicated by patients Functional Deficits. 4. Patient will return to all functional activities without increased symptoms or restriction. 5. Pt will deny pain with running or after aerobic activity. (patient specific functional goal)             Progression Towards Functional goals:  [x] Patient is progressing as expected towards functional goals listed. [] Progression is slowed due to complexities listed. [] Progression has been slowed due to co-morbidities.   [] Plan just implemented, too soon to assess goals progression  [] Other:     ASSESSMENT: see d/c    Treatment/Activity Tolerance:  [x] Patient tolerated treatment well [] Patient limited by fatique  [] Patient limited by pain  [] Patient limited by other medical complications  [] Other:     Prognosis: [x] Good [] Fair  [] Poor    Patient Requires Follow-up: [x] Yes  [x] No    PLAN:   [] Continue per plan of care [] Alter current plan (see comments)  [] Plan of care initiated [] Hold pending MD visit [x] Discharge    Electronically signed by: Bonita Evangelista, [address] [place] [address]

## 2019-07-11 ENCOUNTER — OFFICE VISIT (OUTPATIENT)
Dept: FAMILY MEDICINE CLINIC | Age: 44
End: 2019-07-11
Payer: COMMERCIAL

## 2019-07-11 VITALS
TEMPERATURE: 95.6 F | RESPIRATION RATE: 12 BRPM | SYSTOLIC BLOOD PRESSURE: 90 MMHG | HEART RATE: 75 BPM | HEIGHT: 63 IN | BODY MASS INDEX: 23.14 KG/M2 | WEIGHT: 130.6 LBS | DIASTOLIC BLOOD PRESSURE: 58 MMHG

## 2019-07-11 DIAGNOSIS — Z11.4 ENCOUNTER FOR SCREENING FOR HIV: ICD-10-CM

## 2019-07-11 DIAGNOSIS — R00.2 PALPITATIONS: ICD-10-CM

## 2019-07-11 DIAGNOSIS — Z00.00 ROUTINE GENERAL MEDICAL EXAMINATION AT A HEALTH CARE FACILITY: Primary | ICD-10-CM

## 2019-07-11 DIAGNOSIS — Z00.00 ROUTINE GENERAL MEDICAL EXAMINATION AT A HEALTH CARE FACILITY: ICD-10-CM

## 2019-07-11 DIAGNOSIS — R23.2 HOT FLASHES: ICD-10-CM

## 2019-07-11 LAB
A/G RATIO: 1.8 (ref 1.1–2.2)
ALBUMIN SERPL-MCNC: 4.6 G/DL (ref 3.4–5)
ALP BLD-CCNC: 79 U/L (ref 40–129)
ALT SERPL-CCNC: 21 U/L (ref 10–40)
ANION GAP SERPL CALCULATED.3IONS-SCNC: 11 MMOL/L (ref 3–16)
AST SERPL-CCNC: 31 U/L (ref 15–37)
BILIRUB SERPL-MCNC: 0.4 MG/DL (ref 0–1)
BUN BLDV-MCNC: 15 MG/DL (ref 7–20)
CALCIUM SERPL-MCNC: 9.2 MG/DL (ref 8.3–10.6)
CHLORIDE BLD-SCNC: 102 MMOL/L (ref 99–110)
CHOLESTEROL, TOTAL: 193 MG/DL (ref 0–199)
CO2: 26 MMOL/L (ref 21–32)
CREAT SERPL-MCNC: 0.7 MG/DL (ref 0.6–1.1)
GFR AFRICAN AMERICAN: >60
GFR NON-AFRICAN AMERICAN: >60
GLOBULIN: 2.5 G/DL
GLUCOSE BLD-MCNC: 89 MG/DL (ref 70–99)
HCT VFR BLD CALC: 39.4 % (ref 36–48)
HDLC SERPL-MCNC: 89 MG/DL (ref 40–60)
HEMOGLOBIN: 13.3 G/DL (ref 12–16)
HIV AG/AB: NORMAL
HIV ANTIGEN: NORMAL
HIV-1 ANTIBODY: NORMAL
HIV-2 AB: NORMAL
LDL CHOLESTEROL CALCULATED: 93 MG/DL
MCH RBC QN AUTO: 31.2 PG (ref 26–34)
MCHC RBC AUTO-ENTMCNC: 33.9 G/DL (ref 31–36)
MCV RBC AUTO: 91.9 FL (ref 80–100)
PDW BLD-RTO: 12.2 % (ref 12.4–15.4)
PLATELET # BLD: 209 K/UL (ref 135–450)
PMV BLD AUTO: 8.5 FL (ref 5–10.5)
POTASSIUM SERPL-SCNC: 4.3 MMOL/L (ref 3.5–5.1)
RBC # BLD: 4.28 M/UL (ref 4–5.2)
SODIUM BLD-SCNC: 139 MMOL/L (ref 136–145)
TOTAL PROTEIN: 7.1 G/DL (ref 6.4–8.2)
TRIGL SERPL-MCNC: 54 MG/DL (ref 0–150)
TSH SERPL DL<=0.05 MIU/L-ACNC: 2.23 UIU/ML (ref 0.27–4.2)
VLDLC SERPL CALC-MCNC: 11 MG/DL
WBC # BLD: 5.2 K/UL (ref 4–11)

## 2019-07-11 PROCEDURE — 99396 PREV VISIT EST AGE 40-64: CPT | Performed by: FAMILY MEDICINE

## 2019-07-11 ASSESSMENT — PATIENT HEALTH QUESTIONNAIRE - PHQ9
SUM OF ALL RESPONSES TO PHQ9 QUESTIONS 1 & 2: 0
2. FEELING DOWN, DEPRESSED OR HOPELESS: 0
SUM OF ALL RESPONSES TO PHQ QUESTIONS 1-9: 0
SUM OF ALL RESPONSES TO PHQ QUESTIONS 1-9: 0
1. LITTLE INTEREST OR PLEASURE IN DOING THINGS: 0

## 2019-07-11 NOTE — PROGRESS NOTES
SUBJECTIVE:   40 y.o. female for annual routine checkup. Current Outpatient Medications   Medication Sig Dispense Refill    Multiple Vitamin (MULTI-VITAMIN DAILY PO) Take 1 tablet by mouth daily.  FISH OIL 1 tablet by Does not apply route daily.  fluticasone (FLONASE) 50 MCG/ACT nasal spray 1 spray by Nasal route daily 1 Bottle 0    fexofenadine-pseudoephedrine (ALLEGRA-D ALLERGY & CONGESTION) 180-240 MG per tablet Take 1 tablet by mouth daily 20 tablet 0    mometasone (ELOCON) 0.1 % lotion Apply  topically 2 times daily. No current facility-administered medications for this visit. Allergies: Penicillins and Sulfa antibiotics   Patient's last menstrual period was 05/12/2019 (approximate). Last PAP:5/18 ?  -Coloposcopy 11/18? ; 5/17 - Dr. Braden Taylor office. History of Abnormal PAP: ASCUS only. Prior mammogram: 2/19; 3/16  Sexually active: yes   Self breast exam: occasionally   LMP: 5/12/19.  - menses 21 days to 3 months past 6 months. Previously q month. Some hot flashes at night past 3-4 months. .  Smoker: no   Alcohol:  5-7/ week- wine   Caffeine: 1 coffee /day . Exercise: runs [employer] / strength training 5-6d/ week. Has occasional palpitations - 2 times in the past 2-3 months. Lasted 15-20 minutes. No shortness of breath or dizziness  . Per watch = 90s heart rate. Occurred at rest .  With exercise HR = 180 with running. Last palpitations were 2-3 weeks ago. ROS:  Feeling well. No dyspnea or chest pain on exertion. No abdominal pain, change in bowel habits, black or bloody stools. No urinary tract symptoms. GYN ROS: menses q 1-3 months, no abnormal bleeding, pelvic pain or discharge, no breast pain or new or enlarging lumps on self exam. No neurological complaints. See patient physical/  ROS questionnaire. Patient's allergies and medications were reviewed. Patient's past medical, surgical, social , and family history were reviewed.     OBJECTIVE:   The Future    PLAN:  Follow a low fat, low cholesterol diet,  continue current healthy lifestyle patterns, including regular cardiovascular exercise >150 minutes per week,  and return for routine annual checkup  Yearly mammogram recommended , as well as monthly self breast exam.   Calcium 1200 mg/ day , Vitamin D 400 IU/ day, and weight bearing exercise. Follow up yearly or as needed.

## 2019-11-19 ENCOUNTER — OFFICE VISIT (OUTPATIENT)
Dept: FAMILY MEDICINE CLINIC | Age: 44
End: 2019-11-19
Payer: COMMERCIAL

## 2019-11-19 VITALS
DIASTOLIC BLOOD PRESSURE: 67 MMHG | WEIGHT: 135.2 LBS | BODY MASS INDEX: 23.95 KG/M2 | SYSTOLIC BLOOD PRESSURE: 112 MMHG | RESPIRATION RATE: 12 BRPM | OXYGEN SATURATION: 100 % | TEMPERATURE: 97.3 F | HEART RATE: 78 BPM

## 2019-11-19 DIAGNOSIS — R55 SYNCOPE, UNSPECIFIED SYNCOPE TYPE: ICD-10-CM

## 2019-11-19 DIAGNOSIS — R00.0 ELEVATED PULSE RATE: Primary | ICD-10-CM

## 2019-11-19 PROCEDURE — 99214 OFFICE O/P EST MOD 30 MIN: CPT | Performed by: FAMILY MEDICINE

## 2019-11-19 PROCEDURE — G8427 DOCREV CUR MEDS BY ELIG CLIN: HCPCS | Performed by: FAMILY MEDICINE

## 2019-11-19 PROCEDURE — G8484 FLU IMMUNIZE NO ADMIN: HCPCS | Performed by: FAMILY MEDICINE

## 2019-11-19 PROCEDURE — 1036F TOBACCO NON-USER: CPT | Performed by: FAMILY MEDICINE

## 2019-11-19 PROCEDURE — G8420 CALC BMI NORM PARAMETERS: HCPCS | Performed by: FAMILY MEDICINE

## 2019-12-11 ENCOUNTER — OFFICE VISIT (OUTPATIENT)
Dept: CARDIOLOGY CLINIC | Age: 44
End: 2019-12-11
Payer: COMMERCIAL

## 2019-12-11 VITALS
HEART RATE: 63 BPM | BODY MASS INDEX: 24.2 KG/M2 | SYSTOLIC BLOOD PRESSURE: 110 MMHG | DIASTOLIC BLOOD PRESSURE: 80 MMHG | WEIGHT: 136.6 LBS

## 2019-12-11 DIAGNOSIS — R00.0 TACHYCARDIA: ICD-10-CM

## 2019-12-11 DIAGNOSIS — R55 SYNCOPE, UNSPECIFIED SYNCOPE TYPE: ICD-10-CM

## 2019-12-11 DIAGNOSIS — R00.2 PALPITATIONS: Primary | ICD-10-CM

## 2019-12-11 PROCEDURE — G8420 CALC BMI NORM PARAMETERS: HCPCS | Performed by: INTERNAL MEDICINE

## 2019-12-11 PROCEDURE — 93000 ELECTROCARDIOGRAM COMPLETE: CPT | Performed by: INTERNAL MEDICINE

## 2019-12-11 PROCEDURE — G8427 DOCREV CUR MEDS BY ELIG CLIN: HCPCS | Performed by: INTERNAL MEDICINE

## 2019-12-11 PROCEDURE — 99204 OFFICE O/P NEW MOD 45 MIN: CPT | Performed by: INTERNAL MEDICINE

## 2019-12-11 PROCEDURE — G8484 FLU IMMUNIZE NO ADMIN: HCPCS | Performed by: INTERNAL MEDICINE

## 2019-12-11 PROCEDURE — 1036F TOBACCO NON-USER: CPT | Performed by: INTERNAL MEDICINE

## 2019-12-26 ENCOUNTER — HOSPITAL ENCOUNTER (OUTPATIENT)
Dept: NON INVASIVE DIAGNOSTICS | Age: 44
Discharge: HOME | End: 2019-12-26
Payer: COMMERCIAL

## 2019-12-26 DIAGNOSIS — R00.0 TACHYCARDIA: ICD-10-CM

## 2019-12-26 DIAGNOSIS — R55 SYNCOPE, UNSPECIFIED SYNCOPE TYPE: ICD-10-CM

## 2019-12-26 LAB
LV EF: 55 %
LVEF MODALITY: NORMAL

## 2019-12-26 PROCEDURE — 93306 TTE W/DOPPLER COMPLETE: CPT

## 2019-12-26 PROCEDURE — 93017 CV STRESS TEST TRACING ONLY: CPT

## 2024-08-13 ENCOUNTER — APPOINTMENT (RX ONLY)
Dept: URBAN - METROPOLITAN AREA CLINIC 44 | Facility: CLINIC | Age: 49
Setting detail: DERMATOLOGY
End: 2024-08-13

## 2024-08-13 DIAGNOSIS — T07XXXA ABRASION OR FRICTION BURN OF OTHER, MULTIPLE, AND UNSPECIFIED SITES, WITHOUT MENTION OF INFECTION: ICD-10-CM

## 2024-08-13 DIAGNOSIS — L57.0 ACTINIC KERATOSIS: ICD-10-CM

## 2024-08-13 DIAGNOSIS — L58.9 RADIODERMATITIS, UNSPECIFIED: ICD-10-CM

## 2024-08-13 PROBLEM — S00.30XA UNSPECIFIED SUPERFICIAL INJURY OF NOSE, INITIAL ENCOUNTER: Status: ACTIVE | Noted: 2024-08-13

## 2024-08-13 PROCEDURE — ? COUNSELING

## 2024-08-13 PROCEDURE — 99212 OFFICE O/P EST SF 10 MIN: CPT

## 2024-08-13 ASSESSMENT — LOCATION DETAILED DESCRIPTION DERM
LOCATION DETAILED: GLABELLA
LOCATION DETAILED: RIGHT NASAL SIDEWALL
LOCATION DETAILED: NASAL DORSUM

## 2024-08-13 ASSESSMENT — LOCATION ZONE DERM
LOCATION ZONE: NOSE
LOCATION ZONE: FACE

## 2024-08-13 ASSESSMENT — LOCATION SIMPLE DESCRIPTION DERM
LOCATION SIMPLE: RIGHT NOSE
LOCATION SIMPLE: NOSE
LOCATION SIMPLE: GLABELLA

## 2024-11-13 ENCOUNTER — APPOINTMENT (RX ONLY)
Dept: URBAN - METROPOLITAN AREA CLINIC 44 | Facility: CLINIC | Age: 49
Setting detail: DERMATOLOGY
End: 2024-11-13

## 2024-11-13 DIAGNOSIS — L57.0 ACTINIC KERATOSIS: ICD-10-CM

## 2024-11-13 DIAGNOSIS — Z92.3 PERSONAL HISTORY OF IRRADIATION: ICD-10-CM

## 2024-11-13 PROCEDURE — 99214 OFFICE O/P EST MOD 30 MIN: CPT

## 2024-11-13 PROCEDURE — ? COUNSELING

## 2024-11-13 PROCEDURE — ? PRESCRIPTION

## 2024-11-13 RX ORDER — MOMETASONE FUROATE 1 MG/G
OINTMENT TOPICAL
Qty: 1 | Refills: 1 | Status: ERX | COMMUNITY
Start: 2024-11-13

## 2024-11-13 RX ORDER — FLUOROURACIL 2 G/40G
CREAM TOPICAL
Qty: 40 | Refills: 0 | Status: ERX | COMMUNITY
Start: 2024-11-13

## 2024-11-13 RX ADMIN — FLUOROURACIL: 2 CREAM TOPICAL at 00:00

## 2024-11-13 RX ADMIN — MOMETASONE FUROATE: 1 OINTMENT TOPICAL at 00:00

## 2024-11-13 ASSESSMENT — LOCATION ZONE DERM: LOCATION ZONE: NOSE

## 2024-11-13 ASSESSMENT — LOCATION DETAILED DESCRIPTION DERM
LOCATION DETAILED: NASAL DORSUM
LOCATION DETAILED: RIGHT NASAL DORSUM

## 2024-11-13 ASSESSMENT — LOCATION SIMPLE DESCRIPTION DERM: LOCATION SIMPLE: NOSE
